# Patient Record
Sex: MALE | Race: WHITE | Employment: FULL TIME | URBAN - METROPOLITAN AREA
[De-identification: names, ages, dates, MRNs, and addresses within clinical notes are randomized per-mention and may not be internally consistent; named-entity substitution may affect disease eponyms.]

---

## 2018-02-20 ENCOUNTER — HOSPITAL ENCOUNTER (OUTPATIENT)
Dept: PHYSICAL THERAPY | Age: 48
Discharge: HOME | End: 2018-02-20
Payer: COMMERCIAL

## 2018-02-20 PROCEDURE — 97110 THERAPEUTIC EXERCISES: CPT

## 2018-02-20 PROCEDURE — 97162 PT EVAL MOD COMPLEX 30 MIN: CPT

## 2018-02-20 NOTE — THERAPY EVALUATION
Astrid Pike  : 1970  Payor/Plan   1. [place]*                                   [address]   2. [address] [place] [address] Therapy  [address], [facility], [address]  [phone]   [phone]      OUTPATIENT PHYSICAL THERAPY:Initial Assessment 2018    ICD-10: Treatment Diagnosis:  M75.02 Adhesive capsulitis of left shoulder   Precautions/Allergies:   Review of patient's allergies indicates no known allergies. Fall Risk Score: 1 (? 5 = High Risk)  MD Orders: Evaluate and Treat MEDICAL/REFERRING DIAGNOSIS:  Shoulder pain [M25.519]   DATE OF ONSET: 2018  REFERRING PHYSICIAN: Tani Lawrence MD  RETURN PHYSICIAN APPOINTMENT: TBD     INITIAL ASSESSMENT:  Mr. Marina Mariee reported a recent history of L shoulder pain associated with L shoulder adhesive capsulitis. He did experience decreased symptoms with improved AROM following a steroid injection from his surgeon yesterday. He has a MRI scheduled and has been referred to physical therapy for treatment. PROBLEM LIST (Impacting functional limitations):  1. Decreased Strength  2. Decreased ADL/Functional Activities  3. Increased Pain  4. Decreased Activity Tolerance INTERVENTIONS PLANNED:  1. Home Exercise Program (HEP)  2. Neuromuscular Re-education/Strengthening  3. Range of Motion (ROM)  4. Therapeutic Activites   TREATMENT PLAN:  Effective Dates: 2018 TO 2018. Frequency/Duration: 1 time a week for 8 weeks  GOALS: (Goals have been discussed and agreed upon with patient.)  SHORT-TERM FUNCTIONAL GOALS: Time Frame: 4 weeks  1. Decrease DASH 4 points to decrease pain 1-2 levels. 2. Increase AROM 5 degrees to improve dressing ADLs. 3. Increase L UE strength to improve lifting ADLs. DISCHARGE GOALS: Time Frame: 8 weeks  1. Decrease DASH 8 points to decrease pain +2 levels.   2. Increase AROM 10 degrees to improve dressing ADLs. 3. Demonstrate independence in home exercises to allow DC from physical therapy. Rehabilitation Potential For Stated Goals: Good  Regarding Wai Garsia's therapy, I certify that the treatment plan above will be carried out by a therapist or under their direction. Thank you for this referral,  Jeny Mejia., PT     Referring Physician Signature: Terrance Randall MD              Date                    The information in this section was collected on 2/20/2018 (except where otherwise noted). HISTORY:   History of Present Injury/Illness (Reason for Referral): The patient reported a recent history of L shoulder pain associated with L shoulder adhesive capsulitis. He did experience decreased symptoms with improved AROM following a steroid injection from his surgeon yesterday. He has a MRI scheduled and has been referred to physical therapy for treatment. Past Medical History/Comorbidities:   Mr. Latrice Escobar  has a past medical history of Hemorrhoids. Mr. Latrice Escobar  has a past surgical history that includes hx hemorrhoidectomy. 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 , Disp: 30 Tab, Rfl: 6    mometasone (NASONEX) 50 mcg/actuation nasal spray, 2 Sprays by Both Nostrils route daily. , Disp: 1 Container, Rfl: 6    cyclobenzaprine (FLEXERIL) 10 mg tablet, Take 1 Tab by mouth three (3) times daily as needed for Muscle Spasm(s). , Disp: 60 Tab, Rfl: 0    naproxen sodium (ANAPROX) 550 mg tablet, Take 1 Tab by mouth two (2) times daily (with meals). , Disp: 60 Tab, Rfl: 0    HYDROcodone-acetaminophen (NORCO) 5-325 mg per tablet, Take 1 Tab by mouth every four (4) hours as needed for Pain.  Max Daily Amount: 6 Tabs., Disp: 45 Tab, Rfl: 0   Date Last Reviewed: 2/20/2018   Number of Personal Factors/Comorbidities that affect the Plan of Care: 1-2: MODERATE COMPLEXITY   EXAMINATION:   Observation/Orthostatic Postural Assessment:   WNL. Palpation:   Tenderness with palpation to his L shoulder. ROM: PROM : L Shoulder   Flexion          120   Abduction       90   ER                 30   IR                   45              Strength:    +3/5 L shoulder strength     5/5 R shoulder strength           Special Tests: N/A  Neurological Screen: N/A  Functional Mobility: Independent   Balance:  Good. Body Structures Involved:  1. Joints  2. Muscles Body Functions Affected:  1. Sensory/Pain  2. Neuromusculoskeletal  3. Movement Related Activities and Participation Affected:  1. General Tasks and Demands  2. Self Care  3. Community, Social and [place]   Number of elements (examined above) that affect the Plan of Care: 3: MODERATE COMPLEXITY   CLINICAL PRESENTATION:   Presentation: Evolving clinical presentation with changing clinical characteristics: MODERATE COMPLEXITY   CLINICAL DECISION MAKING:   Outcome Measure: Tool Used: Disabilities of the Arm, Shoulder and Hand (DASH) Questionnaire - Quick Version  Score:  Initial: 28/55  Most Recent: X/55 (Date: -- )   Interpretation of Score: The DASH is designed to measure the activities of daily living in person's with upper extremity dysfunction or pain. Each section is scored on a 1-5 scale, 5 representing the greatest disability. The scores of each section are added together for a total score of 55. Score 11 12-19 20-28 29-37 38-45 46-54 55   Modifier CH CI CJ CK CL CM CN     ?  Changing and Maintaining Body Position:    [id number] - CURRENT STATUS: CJ - 20%-39% impaired, limited or restricted    - GOAL STATUS: CI - 1%-19% impaired, limited or restricted    - D/C STATUS:  ---------------To be determined---------------      Medical Necessity:   · Patient demonstrates good rehab potential due to higher previous functional level.  Reason for Services/Other Comments:  · Patient continues to require skilled intervention due to his limited ability to perform independent ADLs with his L shoulder. .   Use of outcome tool(s) and clinical judgement create a POC that gives a: Questionable prediction of patient's progress: MODERATE COMPLEXITY            TREATMENT:   (In addition to Assessment/Re-Assessment sessions the following treatments were rendered)  Pre-treatment Symptoms/Complaints: The patient reported a recent history of L shoulder pain associated with L shoulder adhesive capsulitis. He did experience decreased symptoms with improved AROM following a steroid injection from his surgeon yesterday. He has a MRI scheduled and has been referred to physical therapy for treatment. Pain: Initial:   Pain Intensity 1: 10  Post Session:  10   Therapeutic Exercise: ( 15): The patient received treatment as below to improve mobility, strength and balance. Required moderate verbal and manual cues to promote proper body alignment, promote proper body posture and promote proper body mechanics. Progressed resistance, range and repetitions as indicated. The patient received exercise instruction followed by patient demonstration of the exercises to include AROM, PROM, and flexibility exercises for IR, ER, flexion, extension to stabilize the patient's shoulder and improve AROM to decrease spasms, pain, and improve function. [facility] Portal  Evaluation: ( X )  Manual Therapy (     ):   Therapeutic Modalities:                                                                                               HEP: As above; handouts given to patient for all exercises. Treatment/Session Assessment:    · Response to Treatment: The patient tolerated today's treatment without symptom exacerbation. The patient demonstrated independence with the initial home exercises and is to perform the exercises in conjunction with continued physical therapy.   The patient should progress to an independent home exercise program within a few weeks  · Compliance with Program/Exercises: compliant all of the time. · Recommendations/Intent for next treatment session:  \"Next visit will focus on advancements to more challenging activities\"  Total Treatment Duration:  PT Patient Time In/Time Out  Time In: 1000  Time Out: [address] [facility]., PT

## 2018-02-20 NOTE — PROGRESS NOTES
Ambulatory/Rehab Services H2 Model Falls Risk Assessment    Risk Factor Pts. ·   Confusion/Disorientation/Impulsivity  []    4 ·   Symptomatic Depression  []   2 ·   Altered Elimination  []   1 ·   Dizziness/Vertigo  []   1 ·   Gender (Male)  [x]   1 ·   Any administered antiepileptics (anticonvulsants):  []   2 ·   Any administered benzodiazepines:  []   1 ·   Visual Impairment (specify):  []   1 ·   Portable Oxygen Use  []   1 ·   Orthostatic ? BP  []   1 ·   History of Recent Falls (within 3 mos.)  []   5     Ability to Rise from Chair (choose one) Pts. ·   Ability to rise in a single movement  []   0 ·   Pushes up, successful in one attempt  []   1 ·   Multiple attempts, but successful  []   3 ·   Unable to rise without assistance  []   4   Total: (5 or greater = High Risk) 1     Falls Prevention Plan:   []                Physical Limitations to Exercise (specify):   []                Mobility Assistance Device (type):   []                Exercise/Equipment Adaptation (specify):    ©2010 [facility] [address] Patent #[id number].  Federal Law prohibits the replication, distribution or use without written permission from [facility]

## 2018-02-28 ENCOUNTER — HOSPITAL ENCOUNTER (OUTPATIENT)
Dept: PHYSICAL THERAPY | Age: 48
Discharge: HOME | End: 2018-02-28
Payer: COMMERCIAL

## 2018-02-28 PROCEDURE — 97140 MANUAL THERAPY 1/> REGIONS: CPT

## 2018-02-28 NOTE — THERAPY EVALUATION
[facility]iram [facility]  : 1970  Payor/Plan   1. [place]                                   [address]   2. [address] [place] [address] Therapy  [address], [facility], [address]  [phone]   [phone]      OUTPATIENT PHYSICAL THERAPY:Daily Note 2018    ICD-10: Treatment Diagnosis:  M75.02 Adhesive capsulitis of left shoulder   Precautions/Allergies:   Review of patient's allergies indicates no known allergies. Fall Risk Score: 1 (? 5 = High Risk)  MD Orders: Evaluate and Treat MEDICAL/REFERRING DIAGNOSIS:  Shoulder pain [M25.519]   DATE OF ONSET: 2018  REFERRING PHYSICIAN: Tia Ervin MD  RETURN PHYSICIAN APPOINTMENT: TBD     INITIAL ASSESSMENT:  Mr. Mariaelena Leo reported a recent history of L shoulder pain associated with L shoulder adhesive capsulitis. He did experience decreased symptoms with improved AROM following a steroid injection from his surgeon yesterday. He has a MRI scheduled and has been referred to physical therapy for treatment. PROBLEM LIST (Impacting functional limitations):  1. Decreased Strength  2. Decreased ADL/Functional Activities  3. Increased Pain  4. Decreased Activity Tolerance INTERVENTIONS PLANNED:  1. Home Exercise Program (HEP)  2. Neuromuscular Re-education/Strengthening  3. Range of Motion (ROM)  4. Therapeutic Activites   TREATMENT PLAN:  Effective Dates: 2018 TO 2018. Frequency/Duration: 1 time a week for 8 weeks  GOALS: (Goals have been discussed and agreed upon with patient.)  SHORT-TERM FUNCTIONAL GOALS: Time Frame: 4 weeks  1. Decrease DASH 4 points to decrease pain 1-2 levels. 2. Increase AROM 5 degrees to improve dressing ADLs. 3. Increase L UE strength to improve lifting ADLs. DISCHARGE GOALS: Time Frame: 8 weeks  1. Decrease DASH 8 points to decrease pain +2 levels.   2. Increase AROM 10 degrees to improve dressing ADLs. 3. Demonstrate independence in home exercises to allow DC from physical therapy. Rehabilitation Potential For Stated Goals: Good  Regarding Wai Garsia's therapy, I certify that the treatment plan above will be carried out by a therapist or under their direction. Thank you for this referral,  Scarlet Cruz., PT                 The information in this section was collected on 2/20/2018 (except where otherwise noted). HISTORY:   History of Present Injury/Illness (Reason for Referral): The patient reported a recent history of L shoulder pain associated with L shoulder adhesive capsulitis. He did experience decreased symptoms with improved AROM following a steroid injection from his surgeon yesterday. He has a MRI scheduled and has been referred to physical therapy for treatment. Past Medical History/Comorbidities:   Mr. Miguel Lennon  has a past medical history of Hemorrhoids. Mr. Miguel Lennon  has a past surgical history that includes hx hemorrhoidectomy. 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 , Disp: 30 Tab, Rfl: 6    mometasone (NASONEX) 50 mcg/actuation nasal spray, 2 Sprays by Both Nostrils route daily. , Disp: 1 Container, Rfl: 6    cyclobenzaprine (FLEXERIL) 10 mg tablet, Take 1 Tab by mouth three (3) times daily as needed for Muscle Spasm(s). , Disp: 60 Tab, Rfl: 0    naproxen sodium (ANAPROX) 550 mg tablet, Take 1 Tab by mouth two (2) times daily (with meals). , Disp: 60 Tab, Rfl: 0    HYDROcodone-acetaminophen (NORCO) 5-325 mg per tablet, Take 1 Tab by mouth every four (4) hours as needed for Pain.  Max Daily Amount: 6 Tabs., Disp: 45 Tab, Rfl: 0   Date Last Reviewed:  2/28/2018   Number of Personal Factors/Comorbidities that affect the Plan of Care: 1-2: MODERATE COMPLEXITY   EXAMINATION:   Observation/Orthostatic Postural Assessment:   WNL. Palpation:   Tenderness with palpation to his L shoulder. ROM: PROM : L Shoulder   Flexion          120   Abduction       90   ER                 30   IR                   45              Strength:    +3/5 L shoulder strength     5/5 R shoulder strength           Special Tests: N/A  Neurological Screen: N/A  Functional Mobility: Independent   Balance:  Good. Body Structures Involved:  1. Joints  2. Muscles Body Functions Affected:  1. Sensory/Pain  2. Neuromusculoskeletal  3. Movement Related Activities and Participation Affected:  1. General Tasks and Demands  2. Self Care  3. Community, Social and [place]   Number of elements (examined above) that affect the Plan of Care: 3: MODERATE COMPLEXITY   CLINICAL PRESENTATION:   Presentation: Evolving clinical presentation with changing clinical characteristics: MODERATE COMPLEXITY   CLINICAL DECISION MAKING:   Outcome Measure: Tool Used: Disabilities of the Arm, Shoulder and Hand (DASH) Questionnaire - Quick Version  Score:  Initial: 28/55  Most Recent: X/55 (Date: -- )   Interpretation of Score: The DASH is designed to measure the activities of daily living in person's with upper extremity dysfunction or pain. Each section is scored on a 1-5 scale, 5 representing the greatest disability. The scores of each section are added together for a total score of 55. Score 11 12-19 20-28 29-37 38-45 46-54 55   Modifier CH CI CJ CK CL CM CN     ? Changing and Maintaining Body Position:    [id number] - CURRENT STATUS: CJ - 20%-39% impaired, limited or restricted    - GOAL STATUS: CI - 1%-19% impaired, limited or restricted    - D/C STATUS:  ---------------To be determined---------------      Medical Necessity:   · Patient demonstrates good rehab potential due to higher previous functional level.   Reason for Services/Other Comments:  · Patient continues to require skilled intervention due to his limited ability to perform independent ADLs with his L shoulder. .   Use of outcome tool(s) and clinical judgement create a POC that gives a: Questionable prediction of patient's progress: MODERATE COMPLEXITY            TREATMENT:   (In addition to Assessment/Re-Assessment sessions the following treatments were rendered)  Pre-treatment Symptoms/Complaints: The patient reported receiving his MRI results which where negative for a RC tear. Pain: Initial:   Pain Intensity 1: 10  Post Session:  8   Therapeutic Exercise: ( ):  The patient received treatment as below to improve mobility, strength and balance. Required moderate verbal and manual cues to promote proper body alignment, promote proper body posture and promote proper body mechanics. Progressed resistance, range and repetitions as indicated. The patient received exercise instruction followed by patient demonstration of the exercises to include AROM, PROM, and flexibility exercises for IR, ER, flexion, extension to stabilize the patient's shoulder and improve AROM to decrease spasms, pain, and improve function. [facility] Portal  Evaluation: (  )  Manual Therapy (     60): The patient received manual therapy to his L shoulder to improve AROM, decrease pain and improve function. Therapeutic Modalities:                                                                                               HEP: As above; handouts given to patient for all exercises. Treatment/Session Assessment:    · Response to Treatment: The patient tolerated today's treatment without symptom exacerbation. The patient demonstrated independence with the initial home exercises and is to perform the exercises in conjunction with continued physical therapy. The patient should progress to an independent home exercise program within a few weeks  · Compliance with Program/Exercises: compliant all of the time.   · Recommendations/Intent for next treatment session: \"Next visit will focus on advancements to more challenging activities\"  Total Treatment Duration:  PT Patient Time In/Time Out  Time In: 1500  Time Out: Mika Muniz PT

## 2018-03-08 ENCOUNTER — HOSPITAL ENCOUNTER (OUTPATIENT)
Dept: PHYSICAL THERAPY | Age: 48
Discharge: HOME | End: 2018-03-08
Payer: COMMERCIAL

## 2018-03-08 PROCEDURE — 97110 THERAPEUTIC EXERCISES: CPT

## 2018-03-08 NOTE — PROGRESS NOTES
Rand Conrad  : 1970  Payor/Plan   1. [place]                                   [address]   2. [address] [place] [address], [facility] [address]  [phone]   [phone]      OUTPATIENT PHYSICAL THERAPY:Daily Note 3/8/2018    ICD-10: Treatment Diagnosis:  M75.02 Adhesive capsulitis of left shoulder   Precautions/Allergies:   Review of patient's allergies indicates no known allergies. Fall Risk Score: 1 (? 5 = High Risk)  MD Orders: Evaluate and Treat MEDICAL/REFERRING DIAGNOSIS:  Shoulder pain [M25.519]   DATE OF ONSET: 2018  REFERRING PHYSICIAN: Johnathan Patten MD  RETURN PHYSICIAN APPOINTMENT: TBD     INITIAL ASSESSMENT:  Mr. Diya Metcalf reported a recent history of L shoulder pain associated with L shoulder adhesive capsulitis. He did experience decreased symptoms with improved AROM following a steroid injection from his surgeon yesterday. He has a MRI scheduled and has been referred to physical therapy for treatment. PROBLEM LIST (Impacting functional limitations):  1. Decreased Strength  2. Decreased ADL/Functional Activities  3. Increased Pain  4. Decreased Activity Tolerance INTERVENTIONS PLANNED:  1. Home Exercise Program (HEP)  2. Neuromuscular Re-education/Strengthening  3. Range of Motion (ROM)  4. Therapeutic Activites   TREATMENT PLAN:  Effective Dates: 2018 TO 2018. Frequency/Duration: 1 time a week for 8 weeks  GOALS: (Goals have been discussed and agreed upon with patient.)  SHORT-TERM FUNCTIONAL GOALS: Time Frame: 4 weeks  1. Decrease DASH 4 points to decrease pain 1-2 levels. 2. Increase AROM 5 degrees to improve dressing ADLs. 3. Increase L UE strength to improve lifting ADLs. DISCHARGE GOALS: Time Frame: 8 weeks  1. Decrease DASH 8 points to decrease pain +2 levels.   2. Increase AROM 10 degrees to improve dressing ADLs. 3. Demonstrate independence in home exercises to allow DC from physical therapy. Rehabilitation Potential For Stated Goals: Good  Regarding Wai Garsia's therapy, I certify that the treatment plan above will be carried out by a therapist or under their direction. Thank you for this referral,  Sabrina Moy, PT                 The information in this section was collected on 2/20/2018 (except where otherwise noted). HISTORY:   History of Present Injury/Illness (Reason for Referral): The patient reported a recent history of L shoulder pain associated with L shoulder adhesive capsulitis. He did experience decreased symptoms with improved AROM following a steroid injection from his surgeon yesterday. He has a MRI scheduled and has been referred to physical therapy for treatment. Past Medical History/Comorbidities:   Mr. Marvin Palomino  has a past medical history of Hemorrhoids. Mr. Marvin Palomino  has a past surgical history that includes hx hemorrhoidectomy. 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 , Disp: 30 Tab, Rfl: 6    mometasone (NASONEX) 50 mcg/actuation nasal spray, 2 Sprays by Both Nostrils route daily. , Disp: 1 Container, Rfl: 6    cyclobenzaprine (FLEXERIL) 10 mg tablet, Take 1 Tab by mouth three (3) times daily as needed for Muscle Spasm(s). , Disp: 60 Tab, Rfl: 0    naproxen sodium (ANAPROX) 550 mg tablet, Take 1 Tab by mouth two (2) times daily (with meals). , Disp: 60 Tab, Rfl: 0    HYDROcodone-acetaminophen (NORCO) 5-325 mg per tablet, Take 1 Tab by mouth every four (4) hours as needed for Pain.  Max Daily Amount: 6 Tabs., Disp: 45 Tab, Rfl: 0   Date Last Reviewed:  3/8/2018   Number of Personal Factors/Comorbidities that affect the Plan of Care: 1-2: MODERATE COMPLEXITY   EXAMINATION:   Observation/Orthostatic Postural Assessment:   WNL. Palpation:   Tenderness with palpation to his L shoulder. ROM: PROM : L Shoulder   Flexion          120   Abduction       90   ER                 30   IR                   45              Strength:    +3/5 L shoulder strength     5/5 R shoulder strength           Special Tests: N/A  Neurological Screen: N/A  Functional Mobility: Independent   Balance:  Good. Body Structures Involved:  1. Joints  2. Muscles Body Functions Affected:  1. Sensory/Pain  2. Neuromusculoskeletal  3. Movement Related Activities and Participation Affected:  1. General Tasks and Demands  2. Self Care  3. Community, Social and [place]   Number of elements (examined above) that affect the Plan of Care: 3: MODERATE COMPLEXITY   CLINICAL PRESENTATION:   Presentation: Evolving clinical presentation with changing clinical characteristics: MODERATE COMPLEXITY   CLINICAL DECISION MAKING:   Outcome Measure: Tool Used: Disabilities of the Arm, Shoulder and Hand (DASH) Questionnaire - Quick Version  Score:  Initial: 28/55  Most Recent: X/55 (Date: -- )   Interpretation of Score: The DASH is designed to measure the activities of daily living in person's with upper extremity dysfunction or pain. Each section is scored on a 1-5 scale, 5 representing the greatest disability. The scores of each section are added together for a total score of 55. Score 11 12-19 20-28 29-37 38-45 46-54 55   Modifier CH CI CJ CK CL CM CN     ? Changing and Maintaining Body Position:    [id number] - CURRENT STATUS: CJ - 20%-39% impaired, limited or restricted    - GOAL STATUS: CI - 1%-19% impaired, limited or restricted    - D/C STATUS:  ---------------To be determined---------------      Medical Necessity:   · Patient demonstrates good rehab potential due to higher previous functional level.   Reason for Services/Other Comments:  · Patient continues to require skilled intervention due to his limited ability to perform independent ADLs with his L shoulder. .   Use of outcome tool(s) and clinical judgement create a POC that gives a: Questionable prediction of patient's progress: MODERATE COMPLEXITY            TREATMENT:   (In addition to Assessment/Re-Assessment sessions the following treatments were rendered)  Pre-treatment Symptoms/Complaints: The patient reported an increase in muscle soreness from his last treatment. Pain: Initial:   Pain Intensity 1: 8  Post Session:  8   Therapeutic Exercise: ( ):  The patient received treatment as below to improve mobility, strength and balance. Required moderate verbal and manual cues to promote proper body alignment, promote proper body posture and promote proper body mechanics. Progressed resistance, range and repetitions as indicated. The patient received exercise instruction followed by patient demonstration of the exercises to include AROM, PROM, and flexibility exercises for IR, ER, flexion, extension to stabilize the patient's shoulder and improve AROM to decrease spasms, pain, and improve function. [employer]  Evaluation: (  )  Manual Therapy (     60): The patient received manual therapy to his L shoulder to improve AROM, decrease pain and improve function. Therapeutic Modalities:                                                                                               HEP: As above; handouts given to patient for all exercises. Treatment/Session Assessment:    · Response to Treatment: The patient tolerated today's treatment without symptom exacerbation. The patient continues to demonstrate independence with the home exercises and is to perform the exercises in conjunction with continued physical therapy. The patient should progress to an independent home exercise program within a few weeks  · Compliance with Program/Exercises: compliant all of the time. · Recommendations/Intent for next treatment session:  \"Next visit will focus on advancements to more challenging activities\"  Total Treatment Duration:  PT Patient Time In/Time Out  Time In: 1500  Time Out: Mika Muniz PT

## 2018-04-29 NOTE — PROGRESS NOTES
Kuldip Rock  : 1970  Payor/Plan   1. [place]                                   [address]   2. [address] [place] [address], [facility] [address]  [phone]   [phone]      OUTPATIENT PHYSICAL THERAPY:Discontinuation Summary 2018    ICD-10: Treatment Diagnosis:  M75.02 Adhesive capsulitis of left shoulder   Precautions/Allergies:   Review of patient's allergies indicates no known allergies. Fall Risk Score: 1 (? 5 = High Risk)  MD Orders: Evaluate and Treat MEDICAL/REFERRING DIAGNOSIS:  Shoulder pain [M25.519]   DATE OF ONSET: 2018  REFERRING PHYSICIAN: Genie Jane MD  RETURN PHYSICIAN APPOINTMENT: TBD     INITIAL ASSESSMENT:  Mr. Guille Gonzalez reported a recent history of L shoulder pain associated with L shoulder adhesive capsulitis. He did experience decreased symptoms with improved AROM following a steroid injection from his surgeon yesterday. He has a MRI scheduled and has been referred to physical therapy for treatment. PROBLEM LIST (Impacting functional limitations):  1. Decreased Strength  2. Decreased ADL/Functional Activities  3. Increased Pain  4. Decreased Activity Tolerance INTERVENTIONS PLANNED:  1. Home Exercise Program (HEP)  2. Neuromuscular Re-education/Strengthening  3. Range of Motion (ROM)  4. Therapeutic Activites   TREATMENT PLAN:  Effective Dates: 2018 TO 2018. Frequency/Duration: 1 time a week for 8 weeks  GOALS: (Goals have been discussed and agreed upon with patient.)  SHORT-TERM FUNCTIONAL GOALS: Time Frame: 4 weeks                                        VOLUNTARY DC  1. Decrease DASH 4 points to decrease pain 1-2 levels. 2. Increase AROM 5 degrees to improve dressing ADLs. 3. Increase L UE strength to improve lifting ADLs. DISCHARGE GOALS: Time Frame: 8 weeks  1.  Decrease DASH 8 points to decrease pain +2 levels. 2. Increase AROM 10 degrees to improve dressing ADLs. 3. Demonstrate independence in home exercises to allow DC from physical therapy. Rehabilitation Potential For Stated Goals: Good  Regarding Wai Garsia's therapy, I certify that the treatment plan above will be carried out by a therapist or under their direction. Thank you for this referral,  Seema Lemons., PT                 The information in this section was collected on 2/20/2018 (except where otherwise noted). HISTORY:   History of Present Injury/Illness (Reason for Referral): The patient reported a recent history of L shoulder pain associated with L shoulder adhesive capsulitis. He did experience decreased symptoms with improved AROM following a steroid injection from his surgeon yesterday. He has a MRI scheduled and has been referred to physical therapy for treatment. Past Medical History/Comorbidities:   Mr. Dario Nageotte  has a past medical history of Hemorrhoids. Mr. Dario Nageotte  has a past surgical history that includes hx hemorrhoidectomy. 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 , Disp: 30 Tab, Rfl: 6    mometasone (NASONEX) 50 mcg/actuation nasal spray, 2 Sprays by Both Nostrils route daily. , Disp: 1 Container, Rfl: 6    cyclobenzaprine (FLEXERIL) 10 mg tablet, Take 1 Tab by mouth three (3) times daily as needed for Muscle Spasm(s). , Disp: 60 Tab, Rfl: 0    naproxen sodium (ANAPROX) 550 mg tablet, Take 1 Tab by mouth two (2) times daily (with meals). , Disp: 60 Tab, Rfl: 0    HYDROcodone-acetaminophen (NORCO) 5-325 mg per tablet, Take 1 Tab by mouth every four (4) hours as needed for Pain.  Max Daily Amount: 6 Tabs., Disp: 45 Tab, Rfl: 0   Date Last Reviewed:  3/8/2018   Number of Personal Factors/Comorbidities that affect the Plan of Care: 1-2: MODERATE COMPLEXITY   EXAMINATION:   Observation/Orthostatic Postural Assessment:   WNL. Palpation:   Tenderness with palpation to his L shoulder. ROM: PROM : L Shoulder   Flexion          120   Abduction       90   ER                 30   IR                   45              Strength:    +3/5 L shoulder strength     5/5 R shoulder strength           Special Tests: N/A  Neurological Screen: N/A  Functional Mobility: Independent   Balance:  Good. Body Structures Involved:  1. Joints  2. Muscles Body Functions Affected:  1. Sensory/Pain  2. Neuromusculoskeletal  3. Movement Related Activities and Participation Affected:  1. General Tasks and Demands  2. Self Care  3. Community, Social and [place]   Number of elements (examined above) that affect the Plan of Care: 3: MODERATE COMPLEXITY   CLINICAL PRESENTATION:   Presentation: Evolving clinical presentation with changing clinical characteristics: MODERATE COMPLEXITY   CLINICAL DECISION MAKING:   Outcome Measure: Tool Used: Disabilities of the Arm, Shoulder and Hand (DASH) Questionnaire - Quick Version  Score:  Initial: 28/55  Most Recent: X/55 (Date: -- )   Interpretation of Score: The DASH is designed to measure the activities of daily living in person's with upper extremity dysfunction or pain. Each section is scored on a 1-5 scale, 5 representing the greatest disability. The scores of each section are added together for a total score of 55. Score 11 12-19 20-28 29-37 38-45 46-54 55   Modifier CH CI CJ CK CL CM CN     ? Changing and Maintaining Body Position:    [id number] - CURRENT STATUS: CJ - 20%-39% impaired, limited or restricted    - GOAL STATUS: CI - 1%-19% impaired, limited or restricted    - D/C STATUS:  ---------------To be determined---------------      Medical Necessity:   · Patient demonstrates good rehab potential due to higher previous functional level.   Reason for Services/Other Comments:  · Patient continues to require skilled intervention due to his limited ability to perform independent ADLs with his L shoulder. .   Use of outcome tool(s) and clinical judgement create a POC that gives a: Questionable prediction of patient's progress: MODERATE COMPLEXITY            TREATMENT:     Treatment/Session Assessment:    · Response to Treatment: The patient voluntarily DC PT. Recommendations/Intent for next treatment session: Voluntary DC.   Sabrina Moy, PT

## 2022-04-08 ENCOUNTER — HOSPITAL ENCOUNTER (OUTPATIENT)
Dept: LAB | Age: 52
Discharge: HOME | End: 2022-04-08

## 2022-04-08 PROCEDURE — 88305 TISSUE EXAM BY PATHOLOGIST: CPT

## 2024-12-18 ENCOUNTER — TRANSCRIBE ORDERS (OUTPATIENT)
Dept: SCHEDULING | Age: 54
End: 2024-12-18

## 2024-12-18 DIAGNOSIS — I71.40 ABDOMINAL AORTIC ANEURYSM (AAA) WITHOUT RUPTURE, UNSPECIFIED PART (HCC): Primary | ICD-10-CM
